# Patient Record
Sex: FEMALE | HISPANIC OR LATINO | Employment: FULL TIME | URBAN - METROPOLITAN AREA
[De-identification: names, ages, dates, MRNs, and addresses within clinical notes are randomized per-mention and may not be internally consistent; named-entity substitution may affect disease eponyms.]

---

## 2019-04-02 ENCOUNTER — NON-PROVIDER VISIT (OUTPATIENT)
Dept: OCCUPATIONAL MEDICINE | Facility: CLINIC | Age: 19
End: 2019-04-02

## 2019-04-02 DIAGNOSIS — Z02.1 PRE-EMPLOYMENT DRUG SCREENING: ICD-10-CM

## 2019-04-02 LAB
AMP AMPHETAMINE: NORMAL
COC COCAINE: NORMAL
INT CON NEG: NORMAL
INT CON POS: NORMAL
MET METHAMPHETAMINES: NORMAL
OPI OPIATES: NORMAL
PCP PHENCYCLIDINE: NORMAL
POC DRUG COMMENT 753798-OCCUPATIONAL HEALTH: NORMAL
THC: NORMAL

## 2019-04-02 PROCEDURE — 80305 DRUG TEST PRSMV DIR OPT OBS: CPT | Performed by: PREVENTIVE MEDICINE

## 2021-06-02 ENCOUNTER — OFFICE VISIT (OUTPATIENT)
Dept: URGENT CARE | Facility: CLINIC | Age: 21
End: 2021-06-02
Payer: COMMERCIAL

## 2021-06-02 VITALS
RESPIRATION RATE: 16 BRPM | HEIGHT: 68 IN | WEIGHT: 222 LBS | HEART RATE: 92 BPM | TEMPERATURE: 98.7 F | SYSTOLIC BLOOD PRESSURE: 142 MMHG | OXYGEN SATURATION: 99 % | BODY MASS INDEX: 33.65 KG/M2 | DIASTOLIC BLOOD PRESSURE: 90 MMHG

## 2021-06-02 DIAGNOSIS — R11.0 NAUSEA: ICD-10-CM

## 2021-06-02 DIAGNOSIS — R10.9 ABDOMINAL CRAMPS: ICD-10-CM

## 2021-06-02 DIAGNOSIS — N92.6 LATE MENSES: ICD-10-CM

## 2021-06-02 LAB
APPEARANCE UR: CLEAR
BILIRUB UR STRIP-MCNC: NEGATIVE MG/DL
COLOR UR AUTO: YELLOW
GLUCOSE UR STRIP.AUTO-MCNC: NEGATIVE MG/DL
INT CON NEG: NEGATIVE
INT CON POS: POSITIVE
KETONES UR STRIP.AUTO-MCNC: NEGATIVE MG/DL
LEUKOCYTE ESTERASE UR QL STRIP.AUTO: NEGATIVE
NITRITE UR QL STRIP.AUTO: NEGATIVE
PH UR STRIP.AUTO: 7.5 [PH] (ref 5–8)
POC URINE PREGNANCY TEST: NEGATIVE
PROT UR QL STRIP: NEGATIVE MG/DL
RBC UR QL AUTO: NORMAL
SP GR UR STRIP.AUTO: 1.02
UROBILINOGEN UR STRIP-MCNC: 0.2 MG/DL

## 2021-06-02 PROCEDURE — 99203 OFFICE O/P NEW LOW 30 MIN: CPT | Performed by: NURSE PRACTITIONER

## 2021-06-02 PROCEDURE — 81002 URINALYSIS NONAUTO W/O SCOPE: CPT | Performed by: NURSE PRACTITIONER

## 2021-06-02 PROCEDURE — 81025 URINE PREGNANCY TEST: CPT | Performed by: NURSE PRACTITIONER

## 2021-06-02 RX ORDER — ONDANSETRON 4 MG/1
4 TABLET, ORALLY DISINTEGRATING ORAL EVERY 6 HOURS PRN
Qty: 15 TABLET | Refills: 0 | Status: SHIPPED | OUTPATIENT
Start: 2021-06-02 | End: 2023-08-13

## 2021-06-02 RX ORDER — ONDANSETRON 4 MG/1
4 TABLET, ORALLY DISINTEGRATING ORAL ONCE
Status: COMPLETED | OUTPATIENT
Start: 2021-06-02 | End: 2021-06-02

## 2021-06-02 RX ADMIN — ONDANSETRON 4 MG: 4 TABLET, ORALLY DISINTEGRATING ORAL at 21:04

## 2021-06-02 ASSESSMENT — ENCOUNTER SYMPTOMS
CHILLS: 0
FLANK PAIN: 0
VOMITING: 0
ABDOMINAL PAIN: 0
FEVER: 0
CONSTIPATION: 0
HEADACHES: 0
DIARRHEA: 0
DIZZINESS: 0
BLOOD IN STOOL: 0
NAUSEA: 1

## 2021-06-02 NOTE — LETTER
June 2, 2021         Patient: Janelle Colindres   YOB: 2000   Date of Visit: 6/2/2021           To Whom it May Concern:    Janelle Colindres was seen in my clinic on 6/2/2021. Please excuse her from work today.   If you have any questions or concerns, please don't hesitate to call.        Sincerely,           RAFAEL Lutz.  Electronically Signed

## 2021-06-03 NOTE — PROGRESS NOTES
Subjective:   Janelle Colindres is a 20 y.o. female who presents for Nausea (bloated, cramps, pt recently stopped taking BCP, and has had symptoms for cycle but it has not started. ) and Medication Reaction (Pt requesting for new birth control methods )      HPI  20-year-old female patient in urgent care for ongoing nausea, bloating, cramps.  Denies any dysuria, hematuria, low back pain.  Denies fever, chills, vomiting or diarrhea.  Recently stopped her birth control as she was having thick vaginal discharge that had an odor as well as constant distention and bloating.  Patient was diagnosed with a gluten allergy and discovered that her birth control pills had gluten in them.  Is new to the area does not currently have a primary care provider needs a referral as well as some counseling for birth control today.  Would like a pregnancy test as she has not had a period in the last 2-1/2 months.  Patient states that prior to being on birth control her period was very irregular and she would go months without 1.    Review of Systems   Constitutional: Negative for chills, fever and malaise/fatigue.   Gastrointestinal: Positive for nausea. Negative for abdominal pain, blood in stool, constipation, diarrhea, melena and vomiting.   Genitourinary: Negative for dysuria, flank pain, frequency, hematuria and urgency.   Neurological: Negative for dizziness and headaches.       There is no problem list on file for this patient.    History reviewed. No pertinent surgical history.  Social History     Tobacco Use   • Smoking status: Never Smoker   • Smokeless tobacco: Never Used   Substance Use Topics   • Alcohol use: Not on file   • Drug use: Not on file      History reviewed. No pertinent family history.   (Allergies, Medications, & Tobacco/Substance Use were reconciled by the Medical Assistant and reviewed by myself. The family history is prepopulated)     Objective:     /90   Pulse 92   Temp 37.1 °C (98.7 °F)   Resp 16   Ht  "1.727 m (5' 8\")   Wt 101 kg (222 lb)   LMP 03/29/2021 (Approximate)   SpO2 99%   BMI 33.75 kg/m²     Physical Exam  Vitals reviewed.   Constitutional:       Appearance: Normal appearance.   Cardiovascular:      Rate and Rhythm: Normal rate and regular rhythm.      Heart sounds: Normal heart sounds.   Pulmonary:      Effort: Pulmonary effort is normal.   Abdominal:      General: Abdomen is flat. Bowel sounds are normal. There is no distension.      Palpations: Abdomen is soft.      Tenderness: There is no abdominal tenderness. There is no right CVA tenderness, left CVA tenderness, guarding or rebound.   Skin:     General: Skin is warm.   Neurological:      Mental Status: She is alert and oriented to person, place, and time.   Psychiatric:         Mood and Affect: Mood normal.         Behavior: Behavior normal.         Thought Content: Thought content normal.         Judgment: Judgment normal.       Lab Results   Component Value Date/Time    POCCOLOR Yellow 06/02/2021 06:15 PM    POCAPPEAR Clear 06/02/2021 06:15 PM    POCLEUKEST Negative 06/02/2021 06:15 PM    POCNITRITE Negative 06/02/2021 06:15 PM    POCUROBILIGE 0.2 06/02/2021 06:15 PM    POCPROTEIN Negative 06/02/2021 06:15 PM    POCURPH 7.5 06/02/2021 06:15 PM    POCBLOOD Moderate 06/02/2021 06:15 PM    POCSPGRV 1.020 06/02/2021 06:15 PM    POCKETONES Negative 06/02/2021 06:15 PM    POCBILIRUBIN Negative 06/02/2021 06:15 PM    POCGLUCUA Negative 06/02/2021 06:15 PM          Assessment/Plan:     1. Abdominal cramps  POCT Urinalysis    POCT PREGNANCY   2. Late menses  REFERRAL TO GYNECOLOGY    AMB REFERRAL TO ESTABLISH WITH [facility]   3. Nausea  ondansetron (ZOFRAN ODT) 4 MG TABLET DISPERSIBLE    ondansetron (ZOFRAN ODT) dispertab 4 mg     HCG negative     Discussed physical examination findings as well as patient presentation, length patient symptoms, missed period is more than likely related to hormonal changes after stopping birth control.  Will provide " patient with referral to primary care as well as gynecology for further counseling as she needs a birth control without gluten.  Discussed urinalysis findings are not consistent with a urinary tract infection however there does appear to be some blood in there so she may be starting her period soon.  Provided patient with Zofran prescription for nausea as well as 1 in clinic today in case she cannot get it filled.    Patient expressed understanding agrees to plan is no further questions at this time.    Differential diagnosis, natural history, supportive care, and indications for immediate follow-up discussed.    Advised the patient to follow-up with the primary care physician for recheck, reevaluation, and consideration of further management.    Please note that this dictation was created using voice recognition software. I have made a reasonable attempt to correct obvious errors, but I expect that there are errors of grammar and possibly content that I did not discover before finalizing the note.    This note was electronically signed GOKUL Quintana

## 2021-08-09 ENCOUNTER — HOSPITAL ENCOUNTER (OUTPATIENT)
Facility: MEDICAL CENTER | Age: 21
End: 2021-08-09
Attending: OBSTETRICS & GYNECOLOGY
Payer: COMMERCIAL

## 2021-08-09 ENCOUNTER — GYNECOLOGY VISIT (OUTPATIENT)
Dept: OBGYN | Facility: CLINIC | Age: 21
End: 2021-08-09
Payer: COMMERCIAL

## 2021-08-09 VITALS — BODY MASS INDEX: 34.06 KG/M2 | WEIGHT: 224 LBS

## 2021-08-09 DIAGNOSIS — Z12.4 SCREENING FOR CERVICAL CANCER: ICD-10-CM

## 2021-08-09 DIAGNOSIS — Z11.3 SCREENING FOR VENEREAL DISEASE: ICD-10-CM

## 2021-08-09 DIAGNOSIS — N91.2 AMENORRHEA: ICD-10-CM

## 2021-08-09 DIAGNOSIS — Z11.51 SCREENING FOR HUMAN PAPILLOMAVIRUS (HPV): ICD-10-CM

## 2021-08-09 PROCEDURE — 87491 CHLMYD TRACH DNA AMP PROBE: CPT

## 2021-08-09 PROCEDURE — 99203 OFFICE O/P NEW LOW 30 MIN: CPT | Performed by: OBSTETRICS & GYNECOLOGY

## 2021-08-09 PROCEDURE — 88175 CYTOPATH C/V AUTO FLUID REDO: CPT

## 2021-08-09 PROCEDURE — 87591 N.GONORRHOEAE DNA AMP PROB: CPT

## 2021-08-09 NOTE — NON-PROVIDER
Pt here for new pt appt  Pt states  Pharmacy verified  Good #:  LMP:  PAP:  BC:  STD Testing:  MAMMO:

## 2021-08-09 NOTE — PROGRESS NOTES
Subjective:      Janelle Colindres is a 21 y.o. female who presents with New Patient        CC: amenorrhea    HPI: 22 yo G0 lmp 4/1/21 presents for evaluation of amenorrhea.  The patient states she has not had a.  For approximately 4 months.  She started taking Isibloom for contraception 1 month ago.  She is on the last row of the pack.  Prior to starting the OCPs, she had been off of oral contraceptives since April.  Onset of menarche was age 11.  Menses have always been irregular.  She has been on OCPs for approximately 3 years, and stopped them in March.    She was also having stomach issues described as nausea and vomiting.  She states that she was only able to eat bland foods and broth without vomiting.  She had some diarrhea, but denied abdominal pain.  The GI symptoms have since resolved.    She complains of some acne, which is worsened since starting the OCPs.  She denies unwanted hair growth.  Her weight has been increasing, with approximately 20 pound weight gain in the past year.    She denies history of migraine with aura, hypertension, DVT, liver dysfunction.  She has never had a Pap smear.  She denies history of sexually transmitted infections.  No history of galactorrhea.    History reviewed. No pertinent past medical history.    History reviewed. No pertinent surgical history.      Current Outpatient Medications:   •  ondansetron (ZOFRAN ODT) 4 MG TABLET DISPERSIBLE, Take 1 tablet by mouth every 6 hours as needed for Nausea. (Patient not taking: Reported on 8/9/2021), Disp: 15 tablet, Rfl: 0    Patient has no known allergies.    Family History   Problem Relation Age of Onset   • No Known Problems Mother    • No Known Problems Father    • No Known Problems Sister        Social History     Socioeconomic History   • Marital status: Single     Spouse name: Not on file   • Number of children: Not on file   • Years of education: Not on file   • Highest education level: Not on file   Occupational History   • Not  on file   Tobacco Use   • Smoking status: Never Smoker   • Smokeless tobacco: Never Used   Vaping Use   • Vaping Use: Never used   Substance and Sexual Activity   • Alcohol use: Yes   • Drug use: Never   • Sexual activity: Yes     Birth control/protection: Pill   Other Topics Concern   • Not on file   Social History Narrative   • Not on file     Social Determinants of Health     Financial Resource Strain:    • Difficulty of Paying Living Expenses:    Food Insecurity:    • Worried About Running Out of Food in the Last Year:    • Ran Out of Food in the Last Year:    Transportation Needs:    • Lack of Transportation (Medical):    • Lack of Transportation (Non-Medical):    Physical Activity:    • Days of Exercise per Week:    • Minutes of Exercise per Session:    Stress:    • Feeling of Stress :    Social Connections:    • Frequency of Communication with Friends and Family:    • Frequency of Social Gatherings with Friends and Family:    • Attends Taoism Services:    • Active Member of Clubs or Organizations:    • Attends Club or Organization Meetings:    • Marital Status:    Intimate Partner Violence:    • Fear of Current or Ex-Partner:    • Emotionally Abused:    • Physically Abused:    • Sexually Abused:        OB History    Para Term  AB Living   0 0 0 0 0 0   SAB TAB Ectopic Molar Multiple Live Births   0 0 0 0 0 0       ROS - as per hpi       Objective:     Wt 102 kg (224 lb)   LMP 2021   BMI 34.06 kg/m²      Physical Exam      GENERAL: Alert, in no apparent distress  PSYCHIATRIC: Appropriate affect, intact insight and judgement.  NECK:  Nontender, no masses.  No Thyromegaly or nodules. No lymphadenopathy.  RESPIRATORY: Normal respiratory effort.  Lungs clear to auscultation.   CARDIOVASCULAR: RRR, no murmur, gallop, or rub.  ABDOMEN: Soft, nontender, nondistended.  No palpable masses.  No rebound or guarding.  No inguinal lymphadenopathy.  No hepatosplenomegaly.  No hernias.  BACK: No CVA  tenderness  EXTREMITIES: No edema  SKIN: No rash    BREAST: No masses or tenderness.  No skin changes.  No nipple inversion or discharge. No axillary lymphadenopathy.      GENITOURINARY:  Normal external genitalia, no lesions.  Normal urethral meatus, no masses or tenderness.  Normal bladder without fullness or masses.  Vagina well estrogenized, no vaginal discharge or lesions.  Cervix without lesions or discharge, nontender.  Scant blood coming from cervix.  Uterus normal size, shape, and contour, nontender.  Adnexa nontender, no masses.  Normal anus and perineum.    Rectal Exam - not indicated.    Urine pregnancy test in office is negative.       Assessment/Plan:        1. Amenorrhea  Suspect secondary to increased BMI.  She is currently taking her first month of OCPs, and I suspect she should have a menses imminently.  If no menses, recommend pelvic ultrasound, TSH, prolactin.  I offered her referral to weight management, she declines.  - US-PELVIC COMPLETE (TRANSABDOMINAL/TRANSVAGINAL) (COMBO); Future  - TSH; Future  - PROLACTIN; Future    2. Screening for cervical cancer  - THINPREP RFLX HPV ASCUS W/CTNG; Future    3. Screening for human papillomavirus (HPV)  - THINPREP RFLX HPV ASCUS W/CTNG; Future    4. Screening for venereal disease  - THINPREP RFLX HPV ASCUS W/CTNG; Future      Follow-up 1 month if no menses.

## 2021-08-10 DIAGNOSIS — Z11.3 SCREENING FOR VENEREAL DISEASE: ICD-10-CM

## 2021-08-10 DIAGNOSIS — Z11.51 SCREENING FOR HUMAN PAPILLOMAVIRUS (HPV): ICD-10-CM

## 2021-08-10 DIAGNOSIS — Z12.4 SCREENING FOR CERVICAL CANCER: ICD-10-CM

## 2021-08-11 LAB
C TRACH DNA GENITAL QL NAA+PROBE: NEGATIVE
CYTOLOGY REG CYTOL: NORMAL
N GONORRHOEA DNA GENITAL QL NAA+PROBE: NEGATIVE
SPECIMEN SOURCE: NORMAL

## 2023-08-13 ENCOUNTER — APPOINTMENT (OUTPATIENT)
Dept: RADIOLOGY | Facility: IMAGING CENTER | Age: 23
End: 2023-08-13
Attending: PHYSICIAN ASSISTANT
Payer: COMMERCIAL

## 2023-08-13 ENCOUNTER — OCCUPATIONAL MEDICINE (OUTPATIENT)
Dept: URGENT CARE | Facility: CLINIC | Age: 23
End: 2023-08-13
Payer: COMMERCIAL

## 2023-08-13 VITALS
BODY MASS INDEX: 31.83 KG/M2 | WEIGHT: 210 LBS | HEIGHT: 68 IN | HEART RATE: 80 BPM | DIASTOLIC BLOOD PRESSURE: 84 MMHG | RESPIRATION RATE: 16 BRPM | SYSTOLIC BLOOD PRESSURE: 130 MMHG | OXYGEN SATURATION: 98 % | TEMPERATURE: 97.8 F
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
## 2023-08-13 DIAGNOSIS — M54.50 ACUTE LEFT-SIDED LOW BACK PAIN WITHOUT SCIATICA: ICD-10-CM

## 2023-08-13 DIAGNOSIS — R82.998 DARK URINE: ICD-10-CM

## 2023-08-13 LAB
APPEARANCE UR: CLEAR
BILIRUB UR STRIP-MCNC: NEGATIVE MG/DL
COLOR UR AUTO: YELLOW
GLUCOSE UR STRIP.AUTO-MCNC: NEGATIVE MG/DL
KETONES UR STRIP.AUTO-MCNC: NEGATIVE MG/DL
LEUKOCYTE ESTERASE UR QL STRIP.AUTO: NEGATIVE
NITRITE UR QL STRIP.AUTO: NEGATIVE
PH UR STRIP.AUTO: 7 [PH] (ref 5–8)
POCT INT CON NEG: NEGATIVE
POCT INT CON POS: POSITIVE
POCT URINE PREGNANCY TEST: NEGATIVE
PROT UR QL STRIP: 30 MG/DL
RBC UR QL AUTO: NORMAL
SP GR UR STRIP.AUTO: 1.02
UROBILINOGEN UR STRIP-MCNC: 0.2 MG/DL

## 2023-08-13 PROCEDURE — 81025 URINE PREGNANCY TEST: CPT | Performed by: PHYSICIAN ASSISTANT

## 2023-08-13 PROCEDURE — 99213 OFFICE O/P EST LOW 20 MIN: CPT | Performed by: PHYSICIAN ASSISTANT

## 2023-08-13 PROCEDURE — 3079F DIAST BP 80-89 MM HG: CPT | Performed by: PHYSICIAN ASSISTANT

## 2023-08-13 PROCEDURE — 72100 X-RAY EXAM L-S SPINE 2/3 VWS: CPT | Mod: TC | Performed by: PHYSICIAN ASSISTANT

## 2023-08-13 PROCEDURE — 3075F SYST BP GE 130 - 139MM HG: CPT | Performed by: PHYSICIAN ASSISTANT

## 2023-08-13 PROCEDURE — 81002 URINALYSIS NONAUTO W/O SCOPE: CPT | Performed by: PHYSICIAN ASSISTANT

## 2023-08-13 RX ORDER — CYCLOBENZAPRINE HCL 5 MG
5-10 TABLET ORAL 2 TIMES DAILY PRN
Qty: 15 TABLET | Refills: 0 | Status: SHIPPED | OUTPATIENT
Start: 2023-08-13 | End: 2023-08-20

## 2023-08-13 RX ORDER — NAPROXEN 500 MG/1
500 TABLET ORAL 2 TIMES DAILY WITH MEALS
Qty: 30 TABLET | Refills: 0 | Status: SHIPPED | OUTPATIENT
Start: 2023-08-13
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
## 2023-08-17 ENCOUNTER — OCCUPATIONAL MEDICINE (OUTPATIENT)
Dept: URGENT CARE | Facility: CLINIC | Age: 23
End: 2023-08-17
Payer: COMMERCIAL

## 2023-08-17 VITALS
BODY MASS INDEX: 33.12 KG/M2 | SYSTOLIC BLOOD PRESSURE: 130 MMHG | HEIGHT: 68 IN | HEART RATE: 92 BPM | DIASTOLIC BLOOD PRESSURE: 86 MMHG | OXYGEN SATURATION: 97 % | TEMPERATURE: 97 F | RESPIRATION RATE: 16 BRPM | WEIGHT: 218.5 LBS
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
## 2023-08-17 DIAGNOSIS — M54.50 ACUTE BILATERAL LOW BACK PAIN WITHOUT SCIATICA: ICD-10-CM

## 2023-08-17 PROCEDURE — 99213 OFFICE O/P EST LOW 20 MIN: CPT

## 2023-08-17 PROCEDURE — 3079F DIAST BP 80-89 MM HG: CPT

## 2023-08-17 PROCEDURE — 3075F SYST BP GE 130 - 139MM HG: CPT

## 2023-08-17 RX ORDER — METHOCARBAMOL 500 MG/1
500 TABLET, FILM COATED ORAL 3 TIMES DAILY
Qty: 10 TABLET | Refills: 0 | Status: SHIPPED | OUTPATIENT
Start: 2023-08-17 | End: 2023-08-21
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
## 2023-08-24 ENCOUNTER — OCCUPATIONAL MEDICINE (OUTPATIENT)
Dept: URGENT CARE | Facility: CLINIC | Age: 23
End: 2023-08-24
Payer: COMMERCIAL

## 2023-08-24 VITALS
TEMPERATURE: 97.4 F | OXYGEN SATURATION: 99 % | HEIGHT: 68 IN | DIASTOLIC BLOOD PRESSURE: 74 MMHG | HEART RATE: 82 BPM | SYSTOLIC BLOOD PRESSURE: 122 MMHG | RESPIRATION RATE: 12 BRPM | WEIGHT: 220.2 LBS | BODY MASS INDEX: 33.37 KG/M2
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
## 2023-08-24 DIAGNOSIS — M54.50 ACUTE LEFT-SIDED LOW BACK PAIN WITHOUT SCIATICA: ICD-10-CM

## 2023-08-24 PROCEDURE — 3078F DIAST BP <80 MM HG: CPT | Performed by: NURSE PRACTITIONER

## 2023-08-24 PROCEDURE — 3074F SYST BP LT 130 MM HG: CPT | Performed by: NURSE PRACTITIONER

## 2023-08-24 PROCEDURE — 99213 OFFICE O/P EST LOW 20 MIN: CPT | Performed by: NURSE PRACTITIONER

## 2023-08-24 RX ORDER — NORGESTIMATE AND ETHINYL ESTRADIOL 0.25-0.035
1 KIT ORAL DAILY
COMMUNITY
Start: 2022-05-20 | End: 2023-08-24

## 2023-08-24 ASSESSMENT — ENCOUNTER SYMPTOMS
TINGLING: 0
BACK PAIN: 1
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
## 2023-08-29 ENCOUNTER — OCCUPATIONAL MEDICINE (OUTPATIENT)
Dept: URGENT CARE | Facility: CLINIC | Age: 23
End: 2023-08-29
Payer: COMMERCIAL

## 2023-08-29 VITALS
DIASTOLIC BLOOD PRESSURE: 84 MMHG | HEIGHT: 68 IN | SYSTOLIC BLOOD PRESSURE: 126 MMHG | RESPIRATION RATE: 16 BRPM | OXYGEN SATURATION: 98 % | BODY MASS INDEX: 32.58 KG/M2 | TEMPERATURE: 97.7 F | WEIGHT: 215 LBS | HEART RATE: 77 BPM

## 2023-08-29 DIAGNOSIS — M54.42 ACUTE LEFT-SIDED LOW BACK PAIN WITH LEFT-SIDED SCIATICA: Primary | ICD-10-CM

## 2023-08-29 PROCEDURE — 3079F DIAST BP 80-89 MM HG: CPT | Performed by: PHYSICIAN ASSISTANT

## 2023-08-29 PROCEDURE — 3074F SYST BP LT 130 MM HG: CPT | Performed by: PHYSICIAN ASSISTANT

## 2023-08-29 PROCEDURE — 99213 OFFICE O/P EST LOW 20 MIN: CPT | Performed by: PHYSICIAN ASSISTANT

## 2023-08-29 RX ORDER — METHYLPREDNISOLONE 4 MG/1
TABLET ORAL
Qty: 21 TABLET | Refills: 0 | Status: SHIPPED | OUTPATIENT
Start: 2023-08-29

## 2023-08-29 NOTE — LETTER
[place] [address] [facility] [address] [place] [address]  Phone:  [phone] - Fax:  [phone]   [facility] Progress Report and Disability Certification  Date of Service: 8/29/2023   No Show:  No  Date / Time of Next Visit: 9/4/2023   Claim Information   Patient Name: Janelle Colindres  Claim Number:     Employer:   [employer] Date of Injury: 8/10/2023     Insurer / TPA: Ji Millan  ID / SSN:     Occupation: Wood ADM  Diagnosis: The encounter diagnosis was Acute left-sided low back pain with left-sided sciatica.    Medical Information   Related to Industrial Injury? Yes    Subjective Complaints:  DOI: 8/10/23   Todays Date 8/29/23 Visit #4   Patient reports worsening left lower back pain.  Pain radiates to her left buttocks.  Worse with sitting on her left side, walking, movements.  Occasional numbness to the localized area of pain.  No pain radiation all the way down her leg, numbness, tingling, weakness, saddle anesthesia, loss of bowel or bladder control.  She has been at work and her work is frustrated with her work restrictions.  She has been trying to follow work restrictions.  She has not taken any naproxen today or NSAIDs for the pain because she states she does not like swallowing pills.  Patient has a physical therapy appointment next week.   Objective Findings: Constitutional: Well-appearing no acute distress  Musculoskeletal: Back -limited range of motion in all directions secondary to pain.  Tenderness to palpation to left paraspinal lumbar back, left-sided sciatic notch.  Negative erythema, edema, crepitus deformity.  No bony tenderness or midline tenderness.  Neurological: Strength 5/5 bilateral lower extremities.   Pre-Existing Condition(s):     Assessment:   Condition Worsened    Status: Additional Care Required  Permanent Disability:No    Plan:      Diagnostics:      Comments:  Plan:  -Discontinue NSAIDs.  Start Medrol Dosepak.  -Follow-up at  physical therapy appointment next week  - Massage, ice and/or heat which ever feels better, and Tylenol per manufacture's instructions. Encouraged walking, stretching, and range of motion exercises as tolerated. Avoid sitting or laying down for long periods of time except for at night during sleep.   - Work restrictions per D39  --Follow-up with occupational medicine      Disability Information   Status: Released to Restricted Duty    From:  8/29/2023  Through: 9/4/2023 Restrictions are: Temporary   Physical Restrictions   Sitting:    Standing:    Stooping:    Bending:      Squatting:    Walking:    Climbing:    Pushing:      Pulling:    Other:    Reaching Above Shoulder (L):   Reaching Above Shoulder (R):       Reaching Below Shoulder (L):    Reaching Below Shoulder (R):      Not to exceed Weight Limits   Carrying(hrs):   Weight Limit(lb):   Lifting(hrs):   Weight  Limit(lb):     Comments: Please excuse from work today.  No lifting, pulling, pushing more than 5 pounds.  No repetitive bending.  Please accommodate for restrictions.    Repetitive Actions   Hands: i.e. Fine Manipulations from Grasping:     Feet: i.e. Operating Foot Controls:     Driving / Operate Machinery:     Health Care Provider’s Original or Electronic Signature  Magdaleno Covarrubias P.A.-C. Health Care Provider’s Original or Electronic Signature    Kirit Mar DO MPH     Clinic Name / Location: [place] [address]  [address] [place] [address] Clinic Phone Number: Dept: [phone]   Appointment Time: 7:15 Pm Visit Start Time: 7:36 PM   Check-In Time:  7:01 Pm Visit Discharge Time: 8:50 PM    Original-Treating Physician or Chiropractor    Page 2-Insurer/TPA    Page 3-Employer    Page 4-Employee

## 2023-08-29 NOTE — LETTER
[place] [id number] [facility] [address] [place] [address]  Phone:  [phone] - Fax:  [phone]   [facility] Progress Report and Disability Certification  Date of Service: 8/29/2023   No Show:  No  Date / Time of Next Visit: 10/2/2023   Claim Information   Patient Name: Janelle Colindres  Claim Number:     Employer:   [employer] Date of Injury: 8/10/2023     Insurer / TPA: Ji Millan  ID / SSN:     Occupation: Wood ADM  Diagnosis: The encounter diagnosis was Acute left-sided low back pain with left-sided sciatica.    Medical Information   Related to Industrial Injury? Yes    Subjective Complaints:  DOI: 8/10/23   Todays Date 8/29/23 Visit #4   Patient reports worsening left lower back pain.  Pain radiates to her left buttocks.  Worse with sitting on her left side, walking, movements.  Occasional numbness to the localized area of pain.  No pain radiation all the way down her leg, numbness, tingling, weakness, saddle anesthesia, loss of bowel or bladder control.  She has been at work and her work is frustrated with her work restrictions.  She has been trying to follow work restrictions.  She has not taken any naproxen today or NSAIDs for the pain because she states she does not like swallowing pills.  Patient has a physical therapy appointment next week.   Objective Findings: Constitutional: Well-appearing no acute distress  Musculoskeletal: Back -limited range of motion in all directions secondary to pain.  Tenderness to palpation to left paraspinal lumbar back, left-sided sciatic notch.  Negative erythema, edema, crepitus deformity.  No bony tenderness or midline tenderness.  Neurological: Strength 5/5 bilateral lower extremities.   Pre-Existing Condition(s):     Assessment:   Condition Worsened    Status: Additional Care Required  Permanent Disability:No    Plan:      Diagnostics:      Comments:  Plan:  -Discontinue NSAIDs.  Start Medrol Dosepak.  -Follow-up at  physical therapy appointment next week  - Massage, ice and/or heat which ever feels better, and Tylenol per manufacture's instructions. Encouraged walking, stretching, and range of motion exercises as tolerated. Avoid sitting or laying down for long periods of time except for at night during sleep.   - Work restrictions per D39  --Follow-up with occupational medicine      Disability Information   Status: Released to Restricted Duty    From:  8/29/2023  Through: 10/2/2023 Restrictions are:     Physical Restrictions   Sitting:    Standing:    Stooping:    Bending:      Squatting:    Walking:    Climbing:    Pushing:      Pulling:    Other:    Reaching Above Shoulder (L):   Reaching Above Shoulder (R):       Reaching Below Shoulder (L):    Reaching Below Shoulder (R):      Not to exceed Weight Limits   Carrying(hrs):   Weight Limit(lb):   Lifting(hrs):   Weight  Limit(lb):     Comments: Please excuse from work today.  No lifting, pulling, pushing more than 5 pounds.  No repetitive bending.  Please accommodate for restrictions.    Repetitive Actions   Hands: i.e. Fine Manipulations from Grasping:     Feet: i.e. Operating Foot Controls:     Driving / Operate Machinery:     Health Care Provider’s Original or Electronic Signature  Magdaleno Covarrubias P.A.-C. Health Care Provider’s Original or Electronic Signature    Kirit Mar DO MPH     Clinic Name / Location: [place] [address]  [address] [place] [address] Clinic Phone Number: Dept: [phone]   Appointment Time: 7:15 Pm Visit Start Time: 7:36 PM   Check-In Time:  7:01 Pm Visit Discharge Time: 8:39 PM    Original-Treating Physician or Chiropractor    Page 2-Insurer/TPA    Page 3-Employer    Page 4-Employee

## 2023-08-30 NOTE — PROGRESS NOTES
"Subjective:     Janelle Colindres is a 23 y.o. female who presents for Back Injury ( f/u )      DOI: 8/10/23   Todays Date 8/29/23 Visit #4   Patient reports worsening left lower back pain.  Pain radiates to her left buttocks.  Worse with sitting on her left side, walking, movements.  Occasional numbness to the localized area of pain.  No pain radiation all the way down her leg, numbness, tingling, weakness, saddle anesthesia, loss of bowel or bladder control.  She has been at work and her work is frustrated with her work restrictions.  She has been trying to follow work restrictions.  She has not taken any naproxen today or NSAIDs for the pain because she states she does not like swallowing pills.  Patient has a physical therapy appointment next week.    PMH:   No pertinent past medical history to this problem  MEDS:  Medications were reviewed in EMR  ALLERGIES:  Allergies were reviewed in EMR  SOCHX:  Works at [employer]   FH:   No pertinent family history to this problem       Objective:     /84 (BP Location: Left arm, Patient Position: Sitting, BP Cuff Size: Adult)   Pulse 77   Temp 36.5 °C (97.7 °F) (Temporal)   Resp 16   Ht 1.727 m (5' 8\")   Wt 97.5 kg (215 lb)   SpO2 98%   BMI 32.69 kg/m²     Constitutional: Well-appearing no acute distress  Musculoskeletal: Back -limited range of motion in all directions secondary to pain.  Tenderness to palpation to left paraspinal lumbar back, left-sided sciatic notch.  Negative erythema, edema, crepitus deformity.  No bony tenderness or midline tenderness.  Neurological: Strength 5/5 bilateral lower extremities.    Assessment/Plan:       1. Acute left-sided low back pain with left-sided sciatica  - methylPREDNISolone (MEDROL DOSEPAK) 4 MG Tablet Therapy Pack; Follow schedule on package instructions.  Dispense: 21 Tablet; Refill: 0  - Referral to Occupational Medicine    Released to Restricted Duty FROM 8/29/2023 TO 10/2/2023  Please excuse from work today.  No " lifting, pulling, pushing more than 5 pounds.  No repetitive bending.  Please accommodate for restrictions.  Plan:  -Discontinue NSAIDs.  Start Medrol Dosepak.  -Follow-up at physical therapy appointment next week  - Massage, ice and/or heat which ever feels better, and Tylenol per manufacture's instructions. Encouraged walking, stretching, and range of motion exercises as tolerated. Avoid sitting or laying down for long periods of time except for at night during sleep.   - Work restrictions per D39  --Follow-up with occupational medicine      Differential diagnosis, natural history, supportive care, and indications for immediate follow-up discussed.

## 2023-09-06 ENCOUNTER — OCCUPATIONAL MEDICINE (OUTPATIENT)
Dept: OCCUPATIONAL MEDICINE | Facility: CLINIC | Age: 23
End: 2023-09-06
Payer: COMMERCIAL

## 2023-09-06 VITALS
BODY MASS INDEX: 33.43 KG/M2 | OXYGEN SATURATION: 98 % | TEMPERATURE: 97.2 F | SYSTOLIC BLOOD PRESSURE: 126 MMHG | RESPIRATION RATE: 16 BRPM | HEART RATE: 93 BPM | WEIGHT: 220.6 LBS | DIASTOLIC BLOOD PRESSURE: 72 MMHG | HEIGHT: 68 IN

## 2023-09-06 DIAGNOSIS — M54.50 ACUTE LEFT-SIDED LOW BACK PAIN WITHOUT SCIATICA: ICD-10-CM

## 2023-09-06 PROCEDURE — 3078F DIAST BP <80 MM HG: CPT | Performed by: NURSE PRACTITIONER

## 2023-09-06 PROCEDURE — 3074F SYST BP LT 130 MM HG: CPT | Performed by: NURSE PRACTITIONER

## 2023-09-06 PROCEDURE — 99213 OFFICE O/P EST LOW 20 MIN: CPT | Performed by: NURSE PRACTITIONER
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
## 2023-09-12 ENCOUNTER — APPOINTMENT (OUTPATIENT)
Dept: PHYSICAL THERAPY | Facility: REHABILITATION | Age: 23
End: 2023-09-12
Attending: NURSE PRACTITIONER
Payer: COMMERCIAL

## 2023-09-14 ENCOUNTER — APPOINTMENT (OUTPATIENT)
Dept: PHYSICAL THERAPY | Facility: REHABILITATION | Age: 23
End: 2023-09-14
Payer: COMMERCIAL

## 2023-09-21 ENCOUNTER — APPOINTMENT (OUTPATIENT)
Dept: PHYSICAL THERAPY | Facility: REHABILITATION | Age: 23
End: 2023-09-21
Payer: COMMERCIAL

## 2023-09-26 ENCOUNTER — APPOINTMENT (OUTPATIENT)
Dept: PHYSICAL THERAPY | Facility: REHABILITATION | Age: 23
End: 2023-09-26
Payer: COMMERCIAL

## 2023-09-28 ENCOUNTER — APPOINTMENT (OUTPATIENT)
Dept: PHYSICAL THERAPY | Facility: REHABILITATION | Age: 23
End: 2023-09-28
Payer: COMMERCIAL

## 2024-08-07 ENCOUNTER — OFFICE VISIT (OUTPATIENT)
Dept: URGENT CARE | Facility: PHYSICIAN GROUP | Age: 24
End: 2024-08-07
Payer: COMMERCIAL

## 2024-08-07 VITALS
WEIGHT: 125.6 LBS | HEART RATE: 78 BPM | SYSTOLIC BLOOD PRESSURE: 126 MMHG | HEIGHT: 68 IN | BODY MASS INDEX: 19.04 KG/M2 | DIASTOLIC BLOOD PRESSURE: 80 MMHG | TEMPERATURE: 98 F | RESPIRATION RATE: 16 BRPM | OXYGEN SATURATION: 97 %

## 2024-08-07 DIAGNOSIS — S86.812A STRAIN OF LEFT CALF MUSCLE: ICD-10-CM

## 2024-08-07 PROCEDURE — 3079F DIAST BP 80-89 MM HG: CPT | Performed by: STUDENT IN AN ORGANIZED HEALTH CARE EDUCATION/TRAINING PROGRAM

## 2024-08-07 PROCEDURE — 3074F SYST BP LT 130 MM HG: CPT | Performed by: STUDENT IN AN ORGANIZED HEALTH CARE EDUCATION/TRAINING PROGRAM

## 2024-08-07 PROCEDURE — 1125F AMNT PAIN NOTED PAIN PRSNT: CPT | Performed by: STUDENT IN AN ORGANIZED HEALTH CARE EDUCATION/TRAINING PROGRAM

## 2024-08-07 PROCEDURE — 99213 OFFICE O/P EST LOW 20 MIN: CPT | Performed by: STUDENT IN AN ORGANIZED HEALTH CARE EDUCATION/TRAINING PROGRAM

## 2024-08-07 ASSESSMENT — PAIN SCALES - GENERAL: PAINLEVEL: 6=MODERATE PAIN

## 2024-08-07 NOTE — LETTER
August 7, 2024    To Whom It May Concern:         This is confirmation that Janelle LEVI Riveraflavio attended her scheduled appointment with Charbel Willams P.A.-C. on 8/07/24.  Excuse from work on 8/7/2024 through 8/11/2024.  May return to work on 8/12/2024.         If you have any questions please do not hesitate to call me at the phone number listed below.    Sincerely,          Charbel Willams P.A.-C.  [phone]

## 2024-08-07 NOTE — PROGRESS NOTES
"Subjective:   Janelle Colindres is a 24 y.o. female who presents for Injury (Possible pulled calf left pt was walking felt a loud pop has been burning since )      HPI:  24-year-old female presents to urgent care for injury to the left calf that occurred 4 days ago.  States that she was walking on Saturday and felt a pop in the back of her calf.  Has had pain since that time.  Still able to walk but does have some discomfort with doing so.  Still has range of motion of the ankle.  No numbness, tingling, radiation of pain up the leg.  She has not taken any medications for symptoms such as NSAIDs.      Medications:    methylPREDNISolone Tbpk  naproxen Tabs    Allergies: Patient has no known allergies.    Problem List: Janelle Colindres does not have a problem list on file.    Surgical History:  No past surgical history on file.    Past Social Hx: Janelle Colindres  reports that she has never smoked. She has never used smokeless tobacco. She reports that she does not currently use alcohol. She reports that she does not use drugs.     Past Family Hx:  Janelle Colindres family history includes No Known Problems in her father, mother, and sister.     Problem list, medications, and allergies reviewed by myself today in Epic.     Objective:     /80 (BP Location: Right arm, Patient Position: Sitting, BP Cuff Size: Large adult)   Pulse 78   Temp 36.7 °C (98 °F) (Temporal)   Resp 16   Ht 1.727 m (5' 8\")   Wt 57 kg (125 lb 9.6 oz)   SpO2 97%   BMI 19.10 kg/m²     Physical Exam  Vitals reviewed.   Cardiovascular:      Rate and Rhythm: Normal rate.      Pulses: Normal pulses.   Pulmonary:      Effort: Pulmonary effort is normal.   Musculoskeletal:      Comments: Left calf: No edema, erythema, or ecchymosis.  No signs of rolling of Achilles tendon or depressions within the calf muscles.  Negative Talbot test.  Plantarflexion dorsiflexion present but does have pain at endpoints.   Neurological:      Mental " Status: She is alert.         Assessment/Plan:     Diagnosis and associated orders:     1. Strain of left calf muscle           Comments/MDM:     Patient's presentation physical exam findings are consistent with strain of the left calf.  No signs of Achilles tendon rupture or full muscle avulsion.  This should be self-limited.  Patient has not tried any anti-inflammatories.  Offered Toradol in clinic but declined injection.  Anti-inflammatories such as ibuprofen for the next 3 to 5 days.  Tylenol may be used as needed.  Ice 3-5 times daily for 20 minutes at a time.  May start heat 3-5 times daily for 20 minutes at a time after the next 48 hours.  Early range of motion such as plantarflexion and dorsiflexion.  Also advised to try and spell the alphabet with her ankle.           Differential diagnosis, natural history, supportive care, and indications for immediate follow-up discussed.    Advised the patient to follow-up with the primary care physician for recheck, reevaluation, and consideration of further management.    Please note that this dictation was created using voice recognition software. I have made a reasonable attempt to correct obvious errors, but I expect that there are errors of grammar and possibly content that I did not discover before finalizing the note.    Electronically signed by Charbel Willams PA-C.

## 2024-08-12 ENCOUNTER — OFFICE VISIT (OUTPATIENT)
Dept: URGENT CARE | Facility: PHYSICIAN GROUP | Age: 24
End: 2024-08-12
Payer: COMMERCIAL

## 2024-08-12 VITALS
TEMPERATURE: 98.9 F | HEART RATE: 77 BPM | SYSTOLIC BLOOD PRESSURE: 116 MMHG | HEIGHT: 68 IN | RESPIRATION RATE: 18 BRPM | OXYGEN SATURATION: 98 % | WEIGHT: 225.8 LBS | DIASTOLIC BLOOD PRESSURE: 66 MMHG | BODY MASS INDEX: 34.22 KG/M2

## 2024-08-12 DIAGNOSIS — S86.812A STRAIN OF LEFT CALF MUSCLE: ICD-10-CM

## 2024-08-12 PROCEDURE — 99213 OFFICE O/P EST LOW 20 MIN: CPT

## 2024-08-12 PROCEDURE — 3078F DIAST BP <80 MM HG: CPT

## 2024-08-12 PROCEDURE — 3074F SYST BP LT 130 MM HG: CPT

## 2024-08-12 ASSESSMENT — ENCOUNTER SYMPTOMS
LEG PAIN: 1
NECK PAIN: 0
MYALGIAS: 0
FALLS: 0
BACK PAIN: 0

## 2024-08-12 NOTE — LETTER
[facility]   PROGRESS REPORT CERTIFICATION OF DISABILITY Claim Number:     Social Security Number:    Patient’s Name:Janelle Colindres Date of Injury:   Employer:   Name of [employer] (if applicable)      Patient’s Job Description/Occupation:        Previous Injuries/Diseases/Surgeries Contributing to the Condition:  NA      Diagnosis:  No diagnosis found.      Related to the Industrial Injury? Yes     Explain:Sustained during job duties      Objective Medical Findings:       X  None - Discharged                         Stable  Yes                 Ratable  No     X  Generally Improved                        Condition Worsened                 Condition Same  May Have Suffered a Permanent Disability  No     Treatment Plan:    Patient has improved markedly, having no symptoms aside from slight residual soreness.  Can continue Tylenol/NSAIDs as needed otherwise discharged at this point        No Change in Therapy                 PT/OT Prescribed                   X  Medication May be Used While Working       Case Management                        PT/OT Discontinued    Consultation    Further Diagnostic Studies:                               Prescription(s)                           X  Released to FULL DUTY /No Restrictions on (Date):  From: 8/12/2024    Certified TOTALLY TEMPORARILY DISABLED (Indicate Dates) From:   To:      Released to RESTRICTED/Modified Duty on (Date): From:   To:                                                                 Restrictions Are:  Permanent     No Sitting                               No Standing                   No Pulling                  Other:      No Bending at Waist           No Stooping                    No Lifting       No Carrying                           No Walking                Lifting Restricted to (lbs.):       No Pushing                            No Climbing                   No Reaching Above Shoulders   Date of Next Visit:    Date of  this Exam:8/12/2024 Physician/Chiropractic Physician Name:THEODORE Vargas Physician/Chiropractic Physician Signature:  Kirit Mar DO MPH   [id number] (Rev. 2/24)

## 2024-08-12 NOTE — PROGRESS NOTES
"Subjective:   Janelle Colindres is a 24 y.o. female who presents for Leg Pain (Pt was in 08/07/24 not getting any better, l calf pain)      Patient presents today with worsening left calf pain after an injury that she sustained while walking on 8/7/2024.  Patient was originally worked up with a calf strain suggested empiric treatment with anti-inflammatories, rest, and RICE therapy.  Patient presents today still complaining of pain to her left calf and also new pain that is more intense down her left Achilles tendon into her calcaneus.  She denies any other injuries falls or trauma    Leg Pain  This is a new problem. The current episode started in the past 7 days. The problem occurs constantly. The problem has been gradually worsening. Pertinent negatives include no myalgias or neck pain. The symptoms are aggravated by standing and walking. She has tried immobilization and NSAIDs for the symptoms. The treatment provided mild relief.       Review of Systems   Musculoskeletal:  Positive for joint pain. Negative for back pain, falls, myalgias and neck pain.       Medications, Allergies, and current problem list reviewed today in Epic.     Objective:     /66 (BP Location: Right arm, Patient Position: Sitting, BP Cuff Size: Adult)   Pulse 77   Temp 37.2 °C (98.9 °F) (Temporal)   Resp 18   Ht 1.727 m (5' 8\")   Wt 102 kg (225 lb 12.8 oz)   SpO2 98%     Physical Exam  Constitutional:       General: She is not in acute distress.     Appearance: Normal appearance. She is not ill-appearing.   HENT:      Head: Normocephalic and atraumatic.   Musculoskeletal:      Right lower leg: Normal.      Left lower leg: Tenderness present. No swelling, deformity, lacerations or bony tenderness. No edema.        Legs:    Neurological:      Mental Status: She is alert.         Assessment/Plan:     Diagnosis and associated orders:     1. Strain of left calf muscle           Comments/MDM:     Patient's history of present illness " and physical exam are consistent with acute left calf sprain.  Patient's injury happened 5 days prior to today, and though she is having new pain distal to the original site, I still feel like this is consistent with typical healing prognosis of Sprain  Patient has been taking ibuprofen consistently, suggested to stagger with Tylenol  No warning signs of worsening condition such as Achilles rupture or fracture.  Believe patient would benefit from immobilization, ankle brace applied in clinic patient states improved pain with ambulation.  States she would like to buy her own store as opposed to in clinic brace  Referral to sports medicine placed. Appreciate their evaluation and treatment and potential physical therapy           Differential diagnosis, natural history, supportive care, and indications for immediate follow-up discussed.    Advised the patient to follow-up with the primary care physician for recheck, reevaluation, and consideration of further management.    Please note that this dictation was created using voice recognition software. I have made a reasonable attempt to correct obvious errors, but I expect that there are errors of grammar and possibly content that I did not discover before finalizing the note.    This note was electronically signed by THEODORE Vargas

## 2024-08-12 NOTE — PATIENT INSTRUCTIONS
Ankle Sprain    An ankle sprain is a stretch or tear in a ligament in the ankle. Ligaments are tissues that connect bones to each other.  The two most common types of ankle sprains are:  Inversion sprain. This happens when the foot turns inward and the ankle rolls outward. It affects the ligament on the outside of the foot (lateral ligament).  Eversion sprain. This happens when the foot turns outward and the ankle rolls inward. It affects the ligament on the inner side of the foot (medial ligament).  What are the causes?  This condition is often caused by accidentally rolling or twisting the ankle.  What increases the risk?  You are more likely to develop this condition if you play sports.  What are the signs or symptoms?  Symptoms of this condition include:  Pain in your ankle.  Swelling.  Bruising. This may develop right after you sprain your ankle or 1-2 days later.  Trouble standing or walking, especially when you turn or change directions.  How is this diagnosed?  This condition is diagnosed with:  A physical exam. During the exam, your health care provider will press on certain parts of your foot and ankle and try to move them in certain ways.  X-ray imaging. These may be taken to see how severe the sprain is and to check for broken bones.  How is this treated?  This condition may be treated with:  A brace or splint. This is used to keep the ankle from moving until it heals.  An elastic bandage. This is used to support the ankle.  Crutches.  Pain medicine.  Surgery. This may be needed if the sprain is severe.  Physical therapy. This may help to improve the range of motion in the ankle.  Follow these instructions at home:  If you have a brace or a splint:  Wear the brace or splint as told by your health care provider. Remove it only as told by your health care provider.  Loosen the brace or splint if your toes tingle, become numb, or turn cold and blue.  Keep the brace or splint clean.  If the brace or splint  is not waterproof:  Do not let it get wet.  Cover it with a watertight covering when you take a bath or a shower.  If you have an elastic bandage (dressing):  Remove it to shower or bathe.  Try not to move your ankle much, but wiggle your toes from time to time. This helps to prevent swelling.  Adjust the dressing to make it more comfortable if it feels too tight.  Loosen the dressing if you have numbness or tingling in your foot, or if your foot becomes cold and blue.  Managing pain, stiffness, and swelling    Take over-the-counter and prescription medicines only as told by your health care provider.  For 2-3 days, keep your ankle raised (elevated) above the level of your heart as much as possible.  If directed, put ice on the injured area:  If you have a removable brace or splint, remove it as told by your health care provider.  Put ice in a plastic bag.  Place a towel between your skin and the bag.  Leave the ice on for 20 minutes, 2-3 times a day.  General instructions  Rest your ankle.  Do not use the injured limb to support your body weight until your health care provider says that you can. Use crutches as told by your health care provider.  Do not use any products that contain nicotine or tobacco, such as cigarettes, e-cigarettes, and chewing tobacco. If you need help quitting, ask your health care provider.  Keep all follow-up visits as told by your health care provider. This is important.  Contact a health care provider if:  You have rapidly increasing bruising or swelling.  Your pain is not relieved with medicine.  Get help right away if:  Your foot or toes become numb or blue.  You have severe pain that gets worse.  Summary  An ankle sprain is a stretch or tear in a ligament in the ankle. Ligaments are tissues that connect bones to each other.  This condition is often caused by accidentally rolling or twisting the ankle.  Symptoms include pain, swelling, bruising, and trouble walking.  To relieve pain and  swelling, put ice on the affected ankle, raise your ankle above the level of your heart, and use an elastic bandage.  Keep all follow-up visits as told by your health care provider. This is important.  This information is not intended to replace advice given to you by your health care provider. Make sure you discuss any questions you have with your health care provider.  Document Revised: 02/10/2022 Document Reviewed: 02/10/2022  Elsevier Patient Education © 2023 [employer]

## 2024-08-12 NOTE — LETTER
August 12, 2024    To Whom It May Concern:         This is confirmation that Janelle Colindres attended her scheduled appointment with THEODORE Vargas on 8/12/24.  She may return to work on 8/14/2024         If you have any questions please do not hesitate to call me at the phone number listed below.    Sincerely,          RAFAEL Vargas.  [phone]

## 2024-08-14 ENCOUNTER — APPOINTMENT (OUTPATIENT)
Dept: URGENT CARE | Facility: PHYSICIAN GROUP | Age: 24
End: 2024-08-14
Payer: COMMERCIAL

## 2025-02-13 ENCOUNTER — HOSPITAL ENCOUNTER (OUTPATIENT)
Facility: MEDICAL CENTER | Age: 25
End: 2025-02-13
Attending: STUDENT IN AN ORGANIZED HEALTH CARE EDUCATION/TRAINING PROGRAM
Payer: COMMERCIAL

## 2025-02-13 ENCOUNTER — OFFICE VISIT (OUTPATIENT)
Dept: URGENT CARE | Facility: PHYSICIAN GROUP | Age: 25
End: 2025-02-13
Payer: COMMERCIAL

## 2025-02-13 VITALS
BODY MASS INDEX: 35.34 KG/M2 | HEART RATE: 100 BPM | WEIGHT: 233.2 LBS | SYSTOLIC BLOOD PRESSURE: 122 MMHG | HEIGHT: 68 IN | DIASTOLIC BLOOD PRESSURE: 72 MMHG | OXYGEN SATURATION: 96 % | RESPIRATION RATE: 16 BRPM | TEMPERATURE: 99.8 F

## 2025-02-13 DIAGNOSIS — N89.8 VAGINAL DISCHARGE: ICD-10-CM

## 2025-02-13 DIAGNOSIS — K64.9 HEMORRHOIDS, UNSPECIFIED HEMORRHOID TYPE: ICD-10-CM

## 2025-02-13 DIAGNOSIS — R10.2 PELVIC PAIN: ICD-10-CM

## 2025-02-13 DIAGNOSIS — N92.6 IRREGULAR PERIODS: ICD-10-CM

## 2025-02-13 LAB
APPEARANCE UR: CLEAR
BILIRUB UR STRIP-MCNC: NEGATIVE MG/DL
COLOR UR AUTO: YELLOW
GLUCOSE UR STRIP.AUTO-MCNC: NEGATIVE MG/DL
KETONES UR STRIP.AUTO-MCNC: NEGATIVE MG/DL
LEUKOCYTE ESTERASE UR QL STRIP.AUTO: NEGATIVE
NITRITE UR QL STRIP.AUTO: NEGATIVE
PH UR STRIP.AUTO: 5.5 [PH] (ref 5–8)
POCT INT CON NEG: NEGATIVE
POCT INT CON POS: POSITIVE
POCT URINE PREGNANCY TEST: NEGATIVE
PROT UR QL STRIP: NEGATIVE MG/DL
RBC UR QL AUTO: NORMAL
SP GR UR STRIP.AUTO: 1.03
UROBILINOGEN UR STRIP-MCNC: 0.2 MG/DL

## 2025-02-13 PROCEDURE — 87086 URINE CULTURE/COLONY COUNT: CPT

## 2025-02-13 PROCEDURE — 81002 URINALYSIS NONAUTO W/O SCOPE: CPT | Performed by: STUDENT IN AN ORGANIZED HEALTH CARE EDUCATION/TRAINING PROGRAM

## 2025-02-13 PROCEDURE — 3074F SYST BP LT 130 MM HG: CPT | Performed by: STUDENT IN AN ORGANIZED HEALTH CARE EDUCATION/TRAINING PROGRAM

## 2025-02-13 PROCEDURE — 87660 TRICHOMONAS VAGIN DIR PROBE: CPT

## 2025-02-13 PROCEDURE — 87510 GARDNER VAG DNA DIR PROBE: CPT

## 2025-02-13 PROCEDURE — 81025 URINE PREGNANCY TEST: CPT | Performed by: STUDENT IN AN ORGANIZED HEALTH CARE EDUCATION/TRAINING PROGRAM

## 2025-02-13 PROCEDURE — 99214 OFFICE O/P EST MOD 30 MIN: CPT | Performed by: STUDENT IN AN ORGANIZED HEALTH CARE EDUCATION/TRAINING PROGRAM

## 2025-02-13 PROCEDURE — 3078F DIAST BP <80 MM HG: CPT | Performed by: STUDENT IN AN ORGANIZED HEALTH CARE EDUCATION/TRAINING PROGRAM

## 2025-02-13 PROCEDURE — 87480 CANDIDA DNA DIR PROBE: CPT

## 2025-02-13 RX ORDER — HYDROCORTISONE ACETATE 25 MG/1
25 SUPPOSITORY RECTAL EVERY 12 HOURS
Qty: 24 SUPPOSITORY | Refills: 0 | Status: SHIPPED | OUTPATIENT
Start: 2025-02-13

## 2025-02-13 ASSESSMENT — ENCOUNTER SYMPTOMS
VOMITING: 0
CHILLS: 0
COUGH: 0
DIARRHEA: 0
FLANK PAIN: 0
SHORTNESS OF BREATH: 0
HEADACHES: 0
FEVER: 0
CONSTIPATION: 0
NAUSEA: 0
DIZZINESS: 0
ABDOMINAL PAIN: 0
PALPITATIONS: 0
WHEEZING: 0

## 2025-02-13 NOTE — PROGRESS NOTES
"Subjective     Janelle Colindres is a 24 y.o. female who presents with Rectal Bleeding (No menstrual cycle x2 months, pelvic px x1 month)            Janelle is a 24 y.o. female who presents to urgent care with rectal bleeding and no menstrual cycle for 2 months.  Patient states she has noticed rectal bleeding when going to the bathroom.  Patient states she has had palpitations more frequently.  States she is not constipated and has not been experiencing diarrhea but bowel movements have been \"mushy.\"  States rectal bleeding was \"dripping\" yesterday and she noticed blood in the toilet bowl this morning.  Patient also brings up concerns of not having a menstrual period for the last 2 months.  She has also had lower abdominal/pelvic pain.  She does also report increased/abnormal vaginal discharge.  No dysuria, urinary frequency.  No urinary urgency.  No nausea, vomiting, diarrhea or constipation.  No fever/chills.        Review of Systems   Constitutional:  Negative for chills, fever and malaise/fatigue.   Respiratory:  Negative for cough, shortness of breath and wheezing.    Cardiovascular:  Negative for chest pain and palpitations.   Gastrointestinal:  Negative for abdominal pain, constipation, diarrhea, nausea and vomiting.   Genitourinary:  Negative for dysuria, flank pain, frequency, hematuria and urgency.   Neurological:  Negative for dizziness and headaches.   All other systems reviewed and are negative.             Objective     /72 (BP Location: Right arm, Patient Position: Sitting, BP Cuff Size: Large adult)   Pulse 100   Temp 37.7 °C (99.8 °F) (Temporal)   Resp 16   Ht 1.727 m (5' 8\")   Wt 106 kg (233 lb 3.2 oz)   SpO2 96%   BMI 35.46 kg/m²      Physical Exam  Vitals reviewed.   Constitutional:       General: She is not in acute distress.     Appearance: Normal appearance. She is not ill-appearing, toxic-appearing or diaphoretic.   HENT:      Head: Normocephalic and atraumatic.   Eyes:      " Extraocular Movements: Extraocular movements intact.      Conjunctiva/sclera: Conjunctivae normal.      Pupils: Pupils are equal, round, and reactive to light.   Cardiovascular:      Rate and Rhythm: Normal rate.   Pulmonary:      Effort: Pulmonary effort is normal.   Abdominal:      General: Abdomen is flat. Bowel sounds are normal. There is no distension.      Palpations: Abdomen is soft.      Tenderness: There is no abdominal tenderness. There is no guarding or rebound.   Genitourinary:     Rectum: External hemorrhoid present.   Skin:     General: Skin is warm and dry.   Neurological:      General: No focal deficit present.      Mental Status: She is alert and oriented to person, place, and time.                                  Assessment & Plan  Pelvic pain  - POCT urinalysis with moderate blood.   - POCT pregnancy was negative.    Orders:    POCT Pregnancy    POCT Urinalysis    URINE CULTURE(NEW); Future    Vaginal discharge  - Declines STD testing. Reports monogamous relationship with .    Orders:    VAGINAL PATHOGENS DNA PANEL; Future    Irregular periods  - LMP approx. 2 months ago.   - POCT pregnancy was negative.  - Not currently on birth control. Referral to women's health for additional follow up.    Orders:    Referral to OB/Gyn    Referral to establish with PCP    Hemorrhoids, unspecified hemorrhoid type    Orders:    hydrocortisone (ANUSOL-HC) 25 MG Suppos; Insert 1 Suppository into the rectum every 12 hours.           Differential diagnoses, supportive care measures and indications for immediate follow-up discussed with patient. Pathogenesis of diagnosis discussed including typical length and natural progression.      Instructed to return to urgent care or nearest emergency department if symptoms fail to improve, for any change in condition, further concerns, or new concerning symptoms.    Patient states understanding and agrees with the plan of care and discharge instructions.           My  total time spent caring for the patient on the day of the encounter was 30 minutes including obtaining patient history, physical exam, discussing differential diagnosis, plan of care, supportive care, appropriate follow-up and indications for immediate follow-up. This does not include time spent on separately billable procedures/tests.

## 2025-02-13 NOTE — LETTER
February 13, 2025    To Whom It May Concern:         This is confirmation that Janelle SISM Bernadine attended her scheduled appointment with Lucy Bliss P.A.-C. on 2/13/25. Please excuse work absences today for medical reasons.         Sincerely,    Lucy Bliss P.A.-C.  [phone]

## 2025-02-14 LAB
CANDIDA DNA VAG QL PROBE+SIG AMP: NEGATIVE
G VAGINALIS DNA VAG QL PROBE+SIG AMP: NEGATIVE
T VAGINALIS DNA VAG QL PROBE+SIG AMP: NEGATIVE

## 2025-02-16 LAB
BACTERIA UR CULT: NORMAL
SIGNIFICANT IND 70042: NORMAL
SITE SITE: NORMAL
SOURCE SOURCE: NORMAL

## 2025-02-17 NOTE — Clinical Note
REFERRAL APPROVAL NOTICE         Sent on February 17, 2025                   Janelle Colindres  [address] [place] [address]                   Dear Ms. Colindres,    After a careful review of the medical information and benefit coverage, [facility] has processed your referral. See below for additional details.    If applicable, you must be actively enrolled with your insurance for coverage of the authorized service. If you have any questions regarding your coverage, please contact your insurance directly.    REFERRAL INFORMATION   Referral #:  [id number]  Referred-To Department    Referred-By Provider:  Gynecology    Lucy Bliss P.A.-C.   [facility]      [address]  [place] [id number] [phone] [address], [address]  [place] [id number] [phone]    Referral Start Date:  02/13/2025  Referral End Date:   02/13/2026             SCHEDULING  If you do not already have an appointment, please call [phone] to make an appointment.     MORE INFORMATION  If you do not already have a [employer] account, sign up at: [email or web address]  You can access your medical information, make appointments, see lab results, billing information, and more.  If you have questions regarding this referral, please contact  the [facility] Referrals department at:             [phone]. Monday - Friday 8:00AM - 5:00PM.     Sincerely,    [facility]

## 2025-02-19 ENCOUNTER — RESULTS FOLLOW-UP (OUTPATIENT)
Dept: URGENT CARE | Facility: CLINIC | Age: 25
End: 2025-02-19

## 2025-02-24 ENCOUNTER — OFFICE VISIT (OUTPATIENT)
Dept: OBGYN | Facility: CLINIC | Age: 25
End: 2025-02-24
Payer: COMMERCIAL

## 2025-02-24 DIAGNOSIS — N92.6 MISSED MENSES: ICD-10-CM

## 2025-02-24 NOTE — PROGRESS NOTES
Pt presents for GYN visit  Last seen on: 8/9/21 [place]  Phone: [phone]   Pharmacy verified   LMP:   Sexually active:   BCM: none  Last PAP: 8/10/21 NILM  Pt states     Missed menses for 2 months and rectal bleeding, blood has been dripping from rectum.   ^negative HCG, negative UA

## 2025-03-04 ENCOUNTER — APPOINTMENT (OUTPATIENT)
Dept: OBGYN | Facility: CLINIC | Age: 25
End: 2025-03-04
Payer: COMMERCIAL

## 2025-03-04 ENCOUNTER — HOSPITAL ENCOUNTER (OUTPATIENT)
Facility: MEDICAL CENTER | Age: 25
End: 2025-03-04
Attending: STUDENT IN AN ORGANIZED HEALTH CARE EDUCATION/TRAINING PROGRAM
Payer: COMMERCIAL

## 2025-03-04 VITALS
WEIGHT: 239 LBS | HEART RATE: 71 BPM | DIASTOLIC BLOOD PRESSURE: 84 MMHG | SYSTOLIC BLOOD PRESSURE: 141 MMHG | BODY MASS INDEX: 36.34 KG/M2

## 2025-03-04 DIAGNOSIS — Z01.419 WOMEN'S ANNUAL ROUTINE GYNECOLOGICAL EXAMINATION: ICD-10-CM

## 2025-03-04 DIAGNOSIS — R10.2 PELVIC PAIN: ICD-10-CM

## 2025-03-04 DIAGNOSIS — N92.6 IRREGULAR MENSES: ICD-10-CM

## 2025-03-04 DIAGNOSIS — K62.5 RECTAL BLEEDING: ICD-10-CM

## 2025-03-04 LAB
POCT INT CON NEG: NEGATIVE
POCT INT CON POS: POSITIVE
POCT URINE PREGNANCY TEST: NEGATIVE

## 2025-03-04 PROCEDURE — 3077F SYST BP >= 140 MM HG: CPT | Performed by: STUDENT IN AN ORGANIZED HEALTH CARE EDUCATION/TRAINING PROGRAM

## 2025-03-04 PROCEDURE — 99203 OFFICE O/P NEW LOW 30 MIN: CPT | Mod: 25 | Performed by: STUDENT IN AN ORGANIZED HEALTH CARE EDUCATION/TRAINING PROGRAM

## 2025-03-04 PROCEDURE — 99385 PREV VISIT NEW AGE 18-39: CPT | Performed by: STUDENT IN AN ORGANIZED HEALTH CARE EDUCATION/TRAINING PROGRAM

## 2025-03-04 PROCEDURE — 88142 CYTOPATH C/V THIN LAYER: CPT

## 2025-03-04 PROCEDURE — 81025 URINE PREGNANCY TEST: CPT | Performed by: STUDENT IN AN ORGANIZED HEALTH CARE EDUCATION/TRAINING PROGRAM

## 2025-03-04 PROCEDURE — 3079F DIAST BP 80-89 MM HG: CPT | Performed by: STUDENT IN AN ORGANIZED HEALTH CARE EDUCATION/TRAINING PROGRAM

## 2025-03-04 NOTE — PROGRESS NOTES
ANNUAL GYNECOLOGY VISIT    Chief Complaint  Annual Exam    Subjective  Janelle Colindres is a 24 y.o. female  Patient's last menstrual period was 2025 (exact date). She presents to clinic today for annual visit and for referral to fertility specialist.    Patient is concerned about her history of irregular menses, pelvic pain, and possible infertility. She states that her menses have always been irregular/infrequent (has gone up to 4 months without a period) and menses usually last 3-4 days. However, last year from Feb - Nov, she was having regular/monthly menses - no changes to diet/weight/exercise/medications at that time. From Nov until now, her menses have become irregular/infrequent again, flow is moderate to heavy. She has 1.5 days of heavy bleeding in which she changes tampon/pad every 2-3 hours and having sever cramping during the first 1-2 days of flow. Cyclic symptoms include breast tenderness, weight gain, and irritability. She reports that during her most recent period on 25, she was having severe, sharp pelvic pain, worse on her left side near her ovary and she passed large blood clots. Denies dysparuenia, declines post coital bleeding, denies hirsutism. Denies vaginal discharge, dysuria. She has never been diagnosed with PCOS or Endometriosis.     She desires referral to [facility], as patient has been actively trying to conceive for 1 year. Reqeusts lab orders (to evaluate for PCOS and thyroid) but unsure if planning on getting it done due to fear of needles. She is considering waiting until she is established with [facility]. Discussed with patient hat if she plans on getting labs done, recommend doing it in morning/fasting, and day 3 cycle if possible.    No longer having rectal bleeding after her hemorrhoid resolved. None since. No pain or constipation. Declines GI referral.       Preventive Care   Immunization History   Administered Date(s) Administered     PFIZER PURPLE CAP SARS-COV-2 VACCINATION (12+) 2021     Last Mammogram: Not indicated  Last Colonoscopy: Not indicated  Last DEXA: Not indicated  H/o osteoporosis or osteopenia: no  HPV vaccine: unsure  Last Pap: 8/10/21 NILM and HPV not tested. Due for PAP today.  History of abnormal pap: no      Gynecology History  Current Sexual Activity: yes - 1  Partners: Male  History of sexually transmitted diseases? no  Abnormal vaginal discharge? no  Significant pelvic pain: Yes  Urinary incontinence: no    Menstrual History  Patient's last menstrual period was 2025 (exact date).  Menarche: 10 - 12 yo.  See HPI    Contraception  Current: none - desires pregnancy.   Past: none      Cancer Risk Assessement:  Family history of:   - Breast cancer: no   - Ovarian cancer: no   - Uterine cancer: no   - Colon cancer: grandfather    Obstetric History  OB History    Para Term  AB Living   0 0 0 0 0 0   SAB IAB Ectopic Molar Multiple Live Births   0 0 0 0 0 0       Past Medical History  Past Medical History:   Diagnosis Date    Anxiety     Depression        Past Surgical History  History reviewed. No pertinent surgical history.    Social History  Social History     Tobacco Use    Smoking status: Never    Smokeless tobacco: Never   Vaping Use    Vaping status: Every Day    Substances: Nicotine, CBD    Devices: Disposable   Substance Use Topics    Alcohol use: Yes     Comment: occ    Drug use: Never        Family History  Family History   Problem Relation Age of Onset    Diabetes Mother         type 2    Hypertension Father     No Known Problems Sister     Diabetes Maternal Grandmother         type 2    Colon Cancer Maternal Grandfather 75    No Known Problems Paternal Grandmother     No Known Problems Paternal Grandfather        Home Medications  Current Outpatient Medications   Medication Sig    hydrocortisone (ANUSOL-HC) 25 MG Suppos Insert 1 Suppository into the rectum every 12 hours.    methylPREDNISolone  "(MEDROL DOSEPAK) 4 MG Tablet Therapy Pack Follow schedule on package instructions. (Patient not taking: Reported on 8/7/2024)    naproxen (NAPROSYN) 500 MG Tab Take 1 Tablet by mouth 2 times a day with meals. (Patient not taking: Reported on 8/7/2024)       Allergies/Reactions  No Known Allergies    ROS  Review of Systems:  Gen: no fevers or chills, no significant weight loss or gain  Respiratory:  no cough or dyspnea  Cardiac:  no chest pain, no palpitations, no syncope  Breast: no breast discharge, pain, lump or skin changes  GI:  no heartburn, no abdominal pain, no nausea or vomiting  Psych: no depression or anxiety  Neuro: no migraines with aura, fainting spells, numbness or tingling    Objective  Wt 239 lb   LMP 02/21/2025 (Exact Date)   BMI 36.34 kg/m²     Constitutional: The patient is well developed and well nourished.  Psychiatric: Patient is oriented to time place and person.   Skin: No rash observed.  Neck: Appears symmetric. Thyroid normal size  Respiratory: normal effort  Breast: Inspection reveals no asymetry or nipple discharge, no skin thickening, dimpling or erythema.  Palpation demonstrates no masses.  Abdomen: Soft, non-tender.  Pelvic Exam: (performed by RAIZA Dobbins-S2)     Vulva: external female genitalia are normal in appearance. No lesions     Urethra - no lesions, no erythema     Vagina: moist, pink, normal ruggae     Cervix: pink, smooth, no lesions, no CMT     Uterus - non-tender, normal size, shape, contour, mobile,      Ovaries: non-tender, no appreciable masses   Pap Smear performed: Yes   Chaperone Present: Roopa Anthony PA-C  Extremities: Legs are symmetric and without tenderness. There is no edema present.    Labs/Imaging:  No results found for: \"HDL\", \"LDL\"  No components found for: \"A1C1\"  No results found for: \"WBC\", \"HGB\", \"HCT\"  No results found for: \"NA\", \"K\", \"CL\", \"CO2\", \"BUN\"    There are no active problems to display for this patient.      Assessment & " Plan  Janelle Colindres is a 24 y.o. female who presents today for Annual Gyn Exam.   Assessment & Plan  Women's annual routine gynecological examination  - Cervical cancer screening: Pap: Collected today. Last PAP: 8/10/21 NILM and HPV not tested     - STI Screen (HIV, Syphilis, Chlamydia / Gonorrhea): declined  - Mammogram: Not indicated   - Colonoscopy: Not indicated  - DEXA: Not indicated   - Immunizations: Recommended Flu and vaccine each season and COVID boosters when indicated.  - Tobacco: Encouraged continued abstinence.  - Diabetes Screen: Not indicated  - Cholesterol Screen: Not indicated  - Counseling: breast self exam  - Anticipatory guidance given. Encouraged adequate water intake, healthy diet, regular exercise. Educated on Pap smear screening and guidelines for age per ACOG and ASCCP. Discussed safe sex, STI prevention, contraception/family planning. Self breast exam taught.   Orders:    THINPREP PAP, REFLEX HPV ON ASC-US AND ABOVE; Future    Pelvic pain  - Discussed various cause of pelvic pain including PCOS vs Endometriosis vs fibroids/cysts. Patient desires transvaginal/transabdominal US.  - If pain persists, consider meeting with Dr. Osullivan for workup for Endometriosis and also Pelvic PT.   - US ordered.  Orders:    US-PELVIC COMPLETE (TRANSABDOMINAL/TRANSVAGINAL) (COMBO); Future    Rectal bleeding  - Resolved after hemorrhoids resolved as well. Discussed rectal bleeding likely related to this. She declines referral to GI at this time.        Irregular menses  - UPT negative today.  - Discussed various causes of irregular menses including PCOS, stress, other hormones.   - Patient desires labs but is considering waiting to get it done until established with [facility]. She desires referral to [facility] to assess for any fertility issues due to her hx of irregular menses and actively trying to conceive for the past year.   - Labs ordered, if she desires getting it done before  meeting with [facility], recommend getting lab work done fasting/in morning/on day 3 of cycle (if possible).   - Referral sent to [facility].  Orders:    POCT Pregnancy    17-OH PROGESTERONE; Future    ESTRADIOL; Future    DHEA SULFATE; Future    FSH/LH; Future    TESTOSTERONE F&T FEMALES/CHILD; Future    HEMOGLOBIN A1C; Future    Lipid Profile; Future    PROLACTIN; Future    TSH WITH REFLEX TO FT4; Future     Return: Annually or PRN    Roopa Anthony P.A.-C.  [facility]   3/4/2025

## 2025-03-04 NOTE — PROGRESS NOTES
Pt presents for GYN visit  Last seen on: 8/9/21 [place]  Phone: [phone]   Pharmacy verified   LMP: 2/21/25  Sexually active: yes  BCM: none  Last PAP: 8/10/21 NILM and HPV not tested   Pt states she started her period on 2/21 she did pass a giant clot that had her bent over in pain. She is also having severe pain over her left ovary states these pains are very sharp. She is also having a abnormal discharge, a lot more urination.     They have been actively trying to conceive for a year. They would like a referral for NNRM.     hx of amenorrhea, w/ missed menses for 4 months in 2021.. Menses have always been irregular   ^ did have pelvic US ordered but it was canceled.   Has been having rectal bleeding with no menses for currently 2 months

## 2025-03-05 DIAGNOSIS — Z01.419 WOMEN'S ANNUAL ROUTINE GYNECOLOGICAL EXAMINATION: ICD-10-CM

## 2025-03-10 LAB — THINPREP PAP, CYTOLOGY NL11781: NORMAL

## 2025-03-12 ENCOUNTER — RESULTS FOLLOW-UP (OUTPATIENT)
Dept: OBGYN | Facility: CLINIC | Age: 25
End: 2025-03-12
Payer: COMMERCIAL

## 2025-03-31 DIAGNOSIS — N91.2 AMENORRHEA: ICD-10-CM

## 2025-03-31 NOTE — PROGRESS NOTES
Assessment & Plan  Amenorrhea  Patient contacted us, notifying that she had tested positive for pregnancy on morning of 3/25/25 and 3/26/25. She tested again on 3/27/25 and it was negative. Since then, she has had two more positive pregnancy tests at home. However, started bleeding yesterday with a large clot She does have appt with Fertility Specialist on 4/7/25.   Recommend serial hcg. Possible SAB.   If hcg dropping, continue serial hcg until 0 and plan to order US 5 weeks from when bleeding stops.   Orders:    HCG QUANTITATIVE; Standing

## 2025-04-01 ENCOUNTER — TELEPHONE (OUTPATIENT)
Dept: OBGYN | Facility: CLINIC | Age: 25
End: 2025-04-01
Payer: COMMERCIAL

## 2025-04-01 NOTE — TELEPHONE ENCOUNTER
Pt called stating wondering if her HCG labs were placed to get done. Informed pt they were pt states is still bleeding heavy advised pt that if she continues to head to ER to be further evaluated for SAB. Pt understood states will get lab drawn and make a further appointment with us in clinic to confirm if pregnancy is viable or not.

## 2025-04-07 ENCOUNTER — HOSPITAL ENCOUNTER (OUTPATIENT)
Dept: RADIOLOGY | Facility: MEDICAL CENTER | Age: 25
End: 2025-04-07
Attending: STUDENT IN AN ORGANIZED HEALTH CARE EDUCATION/TRAINING PROGRAM
Payer: COMMERCIAL

## 2025-04-07 DIAGNOSIS — R10.2 PELVIC PAIN: ICD-10-CM

## 2025-04-07 PROCEDURE — 76830 TRANSVAGINAL US NON-OB: CPT

## 2025-04-11 ENCOUNTER — APPOINTMENT (OUTPATIENT)
Dept: URGENT CARE | Facility: PHYSICIAN GROUP | Age: 25
End: 2025-04-11
Payer: COMMERCIAL

## 2025-04-11 ENCOUNTER — OFFICE VISIT (OUTPATIENT)
Dept: URGENT CARE | Facility: PHYSICIAN GROUP | Age: 25
End: 2025-04-11
Payer: COMMERCIAL

## 2025-04-11 VITALS
OXYGEN SATURATION: 98 % | SYSTOLIC BLOOD PRESSURE: 124 MMHG | RESPIRATION RATE: 12 BRPM | DIASTOLIC BLOOD PRESSURE: 86 MMHG | TEMPERATURE: 98.2 F | WEIGHT: 233 LBS | HEART RATE: 87 BPM | BODY MASS INDEX: 35.43 KG/M2

## 2025-04-11 DIAGNOSIS — R05.1 ACUTE COUGH: ICD-10-CM

## 2025-04-11 DIAGNOSIS — M79.671 FOOT PAIN, RIGHT: ICD-10-CM

## 2025-04-11 DIAGNOSIS — Z87.09 HISTORY OF ASTHMA: ICD-10-CM

## 2025-04-11 DIAGNOSIS — Z75.8 DOES NOT HAVE PRIMARY CARE PROVIDER: ICD-10-CM

## 2025-04-11 DIAGNOSIS — J01.40 ACUTE NON-RECURRENT PANSINUSITIS: ICD-10-CM

## 2025-04-11 PROCEDURE — 3079F DIAST BP 80-89 MM HG: CPT

## 2025-04-11 PROCEDURE — 3074F SYST BP LT 130 MM HG: CPT

## 2025-04-11 PROCEDURE — 99213 OFFICE O/P EST LOW 20 MIN: CPT

## 2025-04-11 RX ORDER — DEXTROMETHORPHAN HYDROBROMIDE AND PROMETHAZINE HYDROCHLORIDE 15; 6.25 MG/5ML; MG/5ML
5 SYRUP ORAL NIGHTLY PRN
Qty: 80 ML | Refills: 0 | Status: SHIPPED | OUTPATIENT
Start: 2025-04-11 | End: 2025-04-16

## 2025-04-11 RX ORDER — ALBUTEROL SULFATE 90 UG/1
1-2 INHALANT RESPIRATORY (INHALATION) EVERY 6 HOURS PRN
Qty: 8.5 G | Refills: 0 | Status: SHIPPED | OUTPATIENT
Start: 2025-04-11

## 2025-04-11 RX ORDER — BENZONATATE 100 MG/1
100 CAPSULE ORAL 3 TIMES DAILY PRN
Qty: 60 CAPSULE | Refills: 0 | Status: SHIPPED | OUTPATIENT
Start: 2025-04-11

## 2025-04-11 ASSESSMENT — ENCOUNTER SYMPTOMS
STRIDOR: 0
HEADACHES: 1
PHOTOPHOBIA: 0
BLURRED VISION: 0
SPUTUM PRODUCTION: 1
DIARRHEA: 0
EYE PAIN: 0
DIZZINESS: 0
MYALGIAS: 1
SINUS PAIN: 1
NAUSEA: 0
WEAKNESS: 0
EYE REDNESS: 0
NECK PAIN: 0
SORE THROAT: 1
TINGLING: 0
COUGH: 1
SENSORY CHANGE: 0
FEVER: 1
FOCAL WEAKNESS: 0
VOMITING: 0
EYE DISCHARGE: 0
DOUBLE VISION: 0
ABDOMINAL PAIN: 0
CHILLS: 1
SHORTNESS OF BREATH: 0
WHEEZING: 0

## 2025-04-11 ASSESSMENT — VISUAL ACUITY: OU: 1

## 2025-04-11 NOTE — LETTER
[facility] [place]  [address]  [place] [address]     April 11, 2025    Patient: Janelle Colindres   YOB: 2000   Date of Visit: 4/11/2025       To Whom It May Concern:    Janelle Colindres was seen and treated in our department on 4/11/2025.     Please excuse Janelle from work 4/11/25-4/12/25.           Sincerely,     RAFAEL Gibson.

## 2025-04-11 NOTE — PROGRESS NOTES
Subjective     Janelle Colindres is a 24 y.o. female who presents with Fever (X1week, on/off, cough chest congestion, body fatigue)    HPI:   Janelle is a 25yo female presenting for cough x1 week. Reports associated sinus pressure, fatigue, sore throat, chills, and nausea. Denies abdominal pain, vomiting, or diarrhea. Denies shortness of breath or increased work of breathing. Pertinent history of asthma, denies current wheezing or exacerbation. Denies dysphagia or difficulty managing oral secretions. No rash. She has attempted Sudafed for relief.      Patient also reports right lateral foot pain. Denies any injury. Patient reports this pain is chronic, she is requesting a referral to Podiatry.     Review of Systems   Constitutional:  Positive for chills and fever. Negative for malaise/fatigue.   HENT:  Positive for congestion, sinus pain and sore throat. Negative for ear discharge and ear pain.    Eyes:  Negative for blurred vision, double vision, photophobia, pain, discharge and redness.   Respiratory:  Positive for cough and sputum production. Negative for shortness of breath, wheezing and stridor.    Gastrointestinal:  Negative for abdominal pain, diarrhea, nausea and vomiting.   Musculoskeletal:  Positive for myalgias. Negative for neck pain.   Skin:  Negative for rash.   Neurological:  Positive for headaches (intermittent, pressure in nature). Negative for dizziness, tingling, sensory change, focal weakness and weakness.     Past Medical History:   Diagnosis Date    Anxiety     Depression      History reviewed. No pertinent surgical history.     Patient has no known allergies.     Social History     Tobacco Use    Smoking status: Never    Smokeless tobacco: Never   Vaping Use    Vaping status: Every Day    Substances: Nicotine, CBD    Devices: Disposable   Substance Use Topics    Alcohol use: Yes     Comment: occ    Drug use: Never     Family History   Problem Relation Age of Onset    Diabetes Mother          type 2    Hypertension Father     No Known Problems Sister     Diabetes Maternal Grandmother         type 2    Colon Cancer Maternal Grandfather 75    No Known Problems Paternal Grandmother     No Known Problems Paternal Grandfather      Medications, Allergies, and current problem list reviewed today in Epic.      Objective     /86 (BP Location: Left arm, Patient Position: Sitting, BP Cuff Size: Large adult)   Pulse 87   Temp 36.8 °C (98.2 °F) (Temporal)   Resp 12   Wt 106 kg (233 lb)   SpO2 98%   BMI 35.43 kg/m²      Physical Exam  Vitals reviewed.   Constitutional:       General: She is not in acute distress.  HENT:      Head: No right periorbital erythema or left periorbital erythema.      Jaw: There is normal jaw occlusion. No tenderness, swelling, pain on movement or malocclusion.      Right Ear: Tympanic membrane, ear canal and external ear normal. Tympanic membrane is not erythematous or bulging.      Left Ear: Ear canal and external ear normal. Tympanic membrane is erythematous. Tympanic membrane is not bulging.      Nose: Congestion present.      Right Turbinates: Enlarged and swollen.      Left Turbinates: Enlarged and swollen.      Right Sinus: Maxillary sinus tenderness present.      Left Sinus: Maxillary sinus tenderness present.      Mouth/Throat:      Lips: No lesions.      Mouth: Mucous membranes are moist. No oral lesions or angioedema.      Tongue: No lesions. Tongue does not deviate from midline.      Palate: No mass and lesions.      Pharynx: Uvula midline. Posterior oropharyngeal erythema present. No oropharyngeal exudate or uvula swelling.      Tonsils: No tonsillar abscesses.   Eyes:      General: Vision grossly intact. Gaze aligned appropriately. No visual field deficit.     Extraocular Movements: Extraocular movements intact.      Right eye: No nystagmus.      Left eye: No nystagmus.      Conjunctiva/sclera: Conjunctivae normal.      Pupils: Pupils are equal, round, and reactive  to light.      Right eye: Pupil is round, reactive and not sluggish.      Left eye: Pupil is round, reactive and not sluggish.      Funduscopic exam:     Right eye: Red reflex present.         Left eye: Red reflex present.  Neck:      Trachea: Trachea normal. No abnormal tracheal secretions or tracheal deviation.   Cardiovascular:      Rate and Rhythm: Normal rate and regular rhythm.      Pulses: Normal pulses.      Heart sounds: Normal heart sounds.   Pulmonary:      Effort: Pulmonary effort is normal. No tachypnea, accessory muscle usage, prolonged expiration, respiratory distress or retractions.      Breath sounds: No stridor, decreased air movement or transmitted upper airway sounds. Wheezing present. No rhonchi or rales.   Musculoskeletal:         General: Normal range of motion.      Cervical back: Full passive range of motion without pain, normal range of motion and neck supple. No erythema, rigidity, tenderness or crepitus. No pain with movement. Normal range of motion.   Lymphadenopathy:      Cervical: No cervical adenopathy.   Skin:     General: Skin is warm and dry.      Findings: No rash.   Neurological:      Mental Status: She is alert. Mental status is at baseline.      Sensory: Sensation is intact.      Motor: Motor function is intact.      Coordination: Coordination is intact.   Psychiatric:         Mood and Affect: Mood normal.         Behavior: Behavior normal.         Thought Content: Thought content normal.       Assessment & Plan    1. Acute non-recurrent pansinusitis   - amoxicillin-clavulanate (AUGMENTIN) 875-125 MG Tab; Take 1 Tablet by mouth 2 times a day for 7 days.  Dispense: 14 Tablet; Refill: 0    2. Acute cough   - benzonatate (TESSALON) 100 MG Cap; Take 1 Capsule by mouth 3 times a day as needed for Cough.  Dispense: 60 Capsule; Refill: 0  - promethazine-dextromethorphan (PROMETHAZINE-DM) 6.25-15 MG/5ML syrup; Take 5 mL by mouth at bedtime as needed for Cough for up to 5 days.   Dispense: 80 mL; Refill: 0    3. History of asthma   - albuterol 108 (90 Base) MCG/ACT Aero Soln inhalation aerosol; Inhale 1-2 Puffs every 6 hours as needed for Shortness of Breath.  Dispense: 8.5 g; Refill: 0    4. Does not have primary care provider   - Referral to establish with PCP    5. Foot pain, right   - Referral to Podiatry       MDM/Comments:   Patient meets IDSA guidelines for ABRS given duration of symptoms, worsening severity, clinical history and physical exam  Consider antihistamine, nasal steroid, anti-inflammatory, and saline rinses  No evidence of venous sinus thrombosis or more serious etiology  Typically these infections display a slow resolution of symptoms starting at 48-72 hours of treatment.  Mild pressure and pain may continue at end of week of antibiotics which is normal and continue the other supportive care until back to baseline     Recommended supportive treatment at home:  OTC Tylenol for fever/discomfort.  OTC supportive care for nasal congestion - saline nasal spray/Flonase nasal spray and/or netipot  Humidifier and steam inhalation/warm showers.  Increase oral fluid intake.  Warm saline gargles for sore throat.     Illness progression and alarm symptoms discussed with patient, emphasizing low threshold for returning to clinic/emergency department for worsening symptoms. Patient is agreeable to the plan and verbalizes understanding, and will follow up if warranted.       Differential diagnosis, natural history, supportive care, and indications for immediate follow-up discussed.      Follow-up as needed if symptoms worsen or fail to improve to PCP, Urgent care or Emergency Room.                                 Electronically signed by GOKUL Andre

## 2025-04-16 ENCOUNTER — RESULTS FOLLOW-UP (OUTPATIENT)
Dept: OBGYN | Facility: CLINIC | Age: 25
End: 2025-04-16
Payer: COMMERCIAL

## 2025-04-16 NOTE — Clinical Note
REFERRAL APPROVAL NOTICE         Sent on April 16, 2025                   Janelle Colindres  [address] [place] [address]                   Dear Ms. Colindres,    After a careful review of the medical information and benefit coverage, [facility] has processed your referral. See below for additional details.    If applicable, you must be actively enrolled with your insurance for coverage of the authorized service. If you have any questions regarding your coverage, please contact your insurance directly.    REFERRAL INFORMATION   Referral #:  [id number]  Referred-To Provider    Referred-By Provider:  Podiatry    THEODORE Gibson JOHN A      [address]  [place] [address]  [phone] [address]  [place] [id number] [phone]    Referral Start Date:  04/11/2025  Referral End Date:   04/11/2026             SCHEDULING  If you do not already have an appointment, please call [phone] to make an appointment.     MORE INFORMATION  If you do not already have a gumi account, sign up at: [email or web address]  You can access your medical information, make appointments, see lab results, billing information, and more.  If you have questions regarding this referral, please contact  the [facility] Referrals department at:             [phone]. Monday - Friday 8:00AM - 5:00PM.     Sincerely,    [facility]

## 2025-05-12 ENCOUNTER — APPOINTMENT (OUTPATIENT)
Dept: MEDICAL GROUP | Facility: CLINIC | Age: 25
End: 2025-05-12
Attending: STUDENT IN AN ORGANIZED HEALTH CARE EDUCATION/TRAINING PROGRAM
Payer: COMMERCIAL

## 2025-05-27 ENCOUNTER — HOSPITAL ENCOUNTER (OUTPATIENT)
Facility: MEDICAL CENTER | Age: 25
End: 2025-05-27
Attending: STUDENT IN AN ORGANIZED HEALTH CARE EDUCATION/TRAINING PROGRAM
Payer: COMMERCIAL

## 2025-05-27 ENCOUNTER — APPOINTMENT (OUTPATIENT)
Dept: GYNECOLOGY | Facility: CLINIC | Age: 25
End: 2025-05-27
Payer: COMMERCIAL

## 2025-05-27 VITALS
HEIGHT: 68 IN | SYSTOLIC BLOOD PRESSURE: 151 MMHG | BODY MASS INDEX: 35.31 KG/M2 | HEART RATE: 74 BPM | DIASTOLIC BLOOD PRESSURE: 75 MMHG | WEIGHT: 233 LBS

## 2025-05-27 DIAGNOSIS — Z31.69 INFERTILITY COUNSELING: ICD-10-CM

## 2025-05-27 DIAGNOSIS — M79.18 MYOFASCIAL PAIN: ICD-10-CM

## 2025-05-27 DIAGNOSIS — N76.0 ACUTE VAGINITIS: ICD-10-CM

## 2025-05-27 DIAGNOSIS — G89.29 CHRONIC PELVIC PAIN IN FEMALE: ICD-10-CM

## 2025-05-27 DIAGNOSIS — N76.0 ACUTE VAGINITIS: Primary | ICD-10-CM

## 2025-05-27 DIAGNOSIS — R10.2 CHRONIC PELVIC PAIN IN FEMALE: ICD-10-CM

## 2025-05-27 PROCEDURE — 87591 N.GONORRHOEAE DNA AMP PROB: CPT

## 2025-05-27 PROCEDURE — 3077F SYST BP >= 140 MM HG: CPT | Performed by: STUDENT IN AN ORGANIZED HEALTH CARE EDUCATION/TRAINING PROGRAM

## 2025-05-27 PROCEDURE — 87491 CHLMYD TRACH DNA AMP PROBE: CPT

## 2025-05-27 PROCEDURE — 87510 GARDNER VAG DNA DIR PROBE: CPT

## 2025-05-27 PROCEDURE — 3078F DIAST BP <80 MM HG: CPT | Performed by: STUDENT IN AN ORGANIZED HEALTH CARE EDUCATION/TRAINING PROGRAM

## 2025-05-27 PROCEDURE — 99214 OFFICE O/P EST MOD 30 MIN: CPT | Performed by: STUDENT IN AN ORGANIZED HEALTH CARE EDUCATION/TRAINING PROGRAM

## 2025-05-27 PROCEDURE — 87480 CANDIDA DNA DIR PROBE: CPT

## 2025-05-27 PROCEDURE — 87660 TRICHOMONAS VAGIN DIR PROBE: CPT

## 2025-05-27 NOTE — PATIENT INSTRUCTIONS
Dr. Gallito Lane MD  Address: [address], [place] [address]  Phone: [phone]
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
## 2025-05-27 NOTE — PROGRESS NOTES
"Pt here as a NP Consult, ref by Roopa Anthony.  LOV with Roopa Anthony on 03.04.25  Pt states she has been having both left and right sided pelvic pain but states that the left side is more severe.   Pt states this pain occur everyday and \"will come and go\" throughout the day. Pt stated this pain started in 12.2024.  Pt denies any pain, bleeding or spotting with intercourse.   Pt states she will have occ milky colored chunky discharge or liquidly clear discharge.   LMP: 05.11.25; Lasting 3-4 days with moderate bleeding.   Last Pap: 03.05.25; Neg with no Hx of abn pap's,  U/S 04.08.25  Cx Hx: Mat Grandpa Colon Cx  Hx STD: No  BC: None; Per pt she is trying to conceive for the pass year.   Marky # [phone]  Pharmacy Verified     "

## 2025-05-28 ENCOUNTER — RESULTS FOLLOW-UP (OUTPATIENT)
Dept: GYNECOLOGY | Facility: CLINIC | Age: 25
End: 2025-05-28
Payer: COMMERCIAL

## 2025-05-28 LAB
C TRACH DNA GENITAL QL NAA+PROBE: NEGATIVE
N GONORRHOEA DNA GENITAL QL NAA+PROBE: NEGATIVE
SPECIMEN SOURCE: NORMAL

## 2025-05-30 NOTE — Clinical Note
REFERRAL APPROVAL NOTICE         Sent on May 30, 2025                   Janelle Colindres  [address] [place] [address]                   Dear Ms. Colindres,    After a careful review of the medical information and benefit coverage, [facility] has processed your referral. See below for additional details.    If applicable, you must be actively enrolled with your insurance for coverage of the authorized service. If you have any questions regarding your coverage, please contact your insurance directly.    REFERRAL INFORMATION   Referral #:  [id number]  Referred-To Provider    Referred-By Provider:  BROOKE Berry SCOTT J      [address]  [address]  [place] [address]  [phone] [id number]  [address]  [place] [address]  [phone]    Referral Start Date:  05/27/2025  Referral End Date:   05/27/2026             SCHEDULING  If you do not already have an appointment, please call [phone] to make an appointment.     MORE INFORMATION  If you do not already have a [employer] account, sign up at: [email or web address]  You can access your medical information, make appointments, see lab results, billing information, and more.  If you have questions regarding this referral, please contact  the [facility] Referrals department at:             [phone]. Monday - Friday 8:00AM - 5:00PM.     Sincerely,    [facility]

## 2025-05-31 NOTE — Clinical Note
REFERRAL APPROVAL NOTICE         Sent on May 30, 2025                   Janelle Colindres  [address] [place] [address]                   Dear Ms. Colindres,    After a careful review of the medical information and benefit coverage, [facility] has processed your referral. See below for additional details.    If applicable, you must be actively enrolled with your insurance for coverage of the authorized service. If you have any questions regarding your coverage, please contact your insurance directly.    REFERRAL INFORMATION   Referral #:  [id number]  Referred-To Department    Referred-By Provider:  Occupational Therapy    Ifeoma Osullivan M.D.   [facility] [address]      [address]  [address]  [place] [address]  [phone] [address]  [address]  [place] [id number] [phone]    Referral Start Date:  05/27/2025  Referral End Date:   05/27/2026             SCHEDULING  If you do not already have an appointment, please call [phone] to make an appointment.     MORE INFORMATION  If you do not already have a [employer] account, sign up at: [email or web address]  You can access your medical information, make appointments, see lab results, billing information, and more.  If you have questions regarding this referral, please contact  the [facility] Referrals department at:             [phone]. Monday - Friday 8:00AM - 5:00PM.     Sincerely,    [facility]